# Patient Record
Sex: FEMALE | NOT HISPANIC OR LATINO | Employment: FULL TIME | URBAN - NONMETROPOLITAN AREA
[De-identification: names, ages, dates, MRNs, and addresses within clinical notes are randomized per-mention and may not be internally consistent; named-entity substitution may affect disease eponyms.]

---

## 2017-10-03 ENCOUNTER — TELEPHONE (OUTPATIENT)
Dept: MEDICAL GROUP | Facility: PHYSICIAN GROUP | Age: 32
End: 2017-10-03

## 2017-10-03 DIAGNOSIS — B20 HIV (HUMAN IMMUNODEFICIENCY VIRUS INFECTION) (HCC): ICD-10-CM

## 2017-10-03 RX ORDER — ATAZANAVIR 300 MG/1
CAPSULE ORAL
Qty: 30 CAP | Refills: 11 | Status: SHIPPED | OUTPATIENT
Start: 2017-10-03 | End: 2018-07-18 | Stop reason: SDUPTHER

## 2017-10-03 RX ORDER — EMTRICITABINE AND TENOFOVIR DISOPROXIL FUMARATE 200; 300 MG/1; MG/1
1 TABLET, FILM COATED ORAL DAILY
Qty: 30 TAB | Refills: 11 | Status: SHIPPED | OUTPATIENT
Start: 2017-10-03 | End: 2018-07-18 | Stop reason: SDUPTHER

## 2017-10-03 NOTE — TELEPHONE ENCOUNTER
Patient did not show for appt. Will see patient October 18, 2017.   Will resume treatment.  Genosure viewed.   RX sent to Pharmacy.

## 2017-10-18 ENCOUNTER — TELEMEDICINE2 (OUTPATIENT)
Dept: MEDICAL GROUP | Facility: PHYSICIAN GROUP | Age: 32
End: 2017-10-18
Payer: COMMERCIAL

## 2017-10-18 VITALS
DIASTOLIC BLOOD PRESSURE: 80 MMHG | WEIGHT: 138 LBS | HEIGHT: 63 IN | TEMPERATURE: 98.4 F | SYSTOLIC BLOOD PRESSURE: 130 MMHG | RESPIRATION RATE: 18 BRPM | BODY MASS INDEX: 24.45 KG/M2 | HEART RATE: 88 BPM

## 2017-10-18 DIAGNOSIS — B20 HIV (HUMAN IMMUNODEFICIENCY VIRUS INFECTION) (HCC): ICD-10-CM

## 2017-10-18 PROCEDURE — 99213 OFFICE O/P EST LOW 20 MIN: CPT | Mod: GT | Performed by: NURSE PRACTITIONER

## 2017-10-18 NOTE — PROGRESS NOTES
"Chief Complaint   Patient presents with   • HIV Positive/AIDS           HISTORY OF PRESENT ILLNESS: Patient is a 32 y.o. 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 She admits to relapse. This without medication for a total of 6 months. Upon coming to long term patient was restarted on Norvir 100 mg, Truvada one daily and Reyataz 301 daily. Patient has only been on this for 2 days I suspect her numbers will return to normal as stated in the past. She complains of itchy skin and constipation.  HIV ROS     Medication: Truvada, Norvir 100 mg, Reyataz 300 mg  Missed medications: Just started regimen    CD4 371   Viral Load 17,500   Kidney Function  - normal     Weight loss? None     Night sweats?  None   Body aches ?   none    Skin ? Very dry and itchy  Nonhealing lesions ?  No Rash  ? No  Warts  ? NO    Neuro: No headache, No sensation changes, No dizziness, No confusion.  Cardiac: No cp, No Bazan, No peripheral edema. No calf pain at rest.  Pulm: No cough, No sob, No sputum   Gastro: No nausea, No vomiting,No diarrhea, No rectal bleeding, No abdominal pain  : No dysuria. No blisters, No incontinence.   Constitutional : No fevers, No night sweats.  Musculoskeletal: No swelling,redness or pain to joints. Normal ROM and gait  Skin: rashes, lesions, itching  Emotional: depression ? Stable  anxiety ?  Stable    Psych: hallucinations ?   NO   Auditory hallucinations ?  NO  Paranoid  ?  NO     DRUG ---DRUG interaction? NONE      Allergies   Allergen Reactions   • Pcn [Penicillins]                ROS: As documented in my HPI      Exam:  Blood pressure 130/80, pulse 88, temperature 36.9 °C (98.4 °F), resp. rate 18, height 1.6 m (5' 3\"), weight 62.6 kg (138 lb), last menstrual period 09/28/2017.  General:  Well nourished, well developed female in " NAD  Head: No lesions, Non tender scalp  Neck: Supple. Thyroid is symmetric. No lymphadenopathy   Oral Cavity: no thrush or gum lesions  Pulmonary: .  Normal effort. No rales, ronchi, or wheezing via Telesteth system  Cardiovascular: Regular rate and rhythm without murmur.   Extremities: no clubbing, cyanosis, or edema.  Psych: Alert and oriented x3. Normal mood and affect.  Neurological: No focal deficits    Please note that this dictation was created using voice recognition software. I have made every reasonable attempt to correct obvious errors, but I expect that there are errors of grammar and possibly content that I did not discover before finalizing the note.    Assessment/Plan:  1. HIV (human immunodeficiency virus infection) (CMS-[facility])     Relapse and HIV patient is out of control Patient to start norvir 100 mg , reyataz 300 mg and Truvada daily.  Hydrocortisone 0.1% lidocaine. The  Stool softeners Colace 250 mg at night for severe constipation increased FLAIR intake.  Return to clinic in 3 months  Refill HIV meds  Recheck CD4 , VL, CBC, and CMP in 3 months

## 2017-10-18 NOTE — ASSESSMENT & PLAN NOTE
This is a 32 year old female who has returned to nursing home. She admits to relapse. This without medication for a total of 6 months. Upon coming to [facility] patient was restarted on Norvir 100 mg, Truvada one daily and Reyataz 301 daily. Patient has only been on this for 2 days I suspect her numbers will return to normal as stated in the past. She complains of itchy skin and constipation.  HIV ROS     Medication: Truvada, Norvir 100 mg, Reyataz 300 mg  Missed medications: Just started regimen    CD4 371   Viral Load 17,500   Kidney Function  - normal     Weight loss? None     Night sweats?  None   Body aches ?   none    Skin ? Very dry and itchy  Nonhealing lesions ?  No Rash  ? No  Warts  ? NO    Neuro: No headache, No sensation changes, No dizziness, No confusion.  Cardiac: No cp, No Bazan, No peripheral edema. No calf pain at rest.  Pulm: No cough, No sob, No sputum   Gastro: No nausea, No vomiting,No diarrhea, No rectal bleeding, No abdominal pain  : No dysuria. No blisters, No incontinence.   Constitutional : No fevers, No night sweats.  Musculoskeletal: No swelling,redness or pain to joints. Normal ROM and gait  Skin: rashes, lesions, itching  Emotional: depression ? Stable  anxiety ?  Stable    Psych: hallucinations ?   NO   Auditory hallucinations ?  NO  Paranoid  ?  NO     DRUG ---DRUG interaction? NONE

## 2018-01-24 ENCOUNTER — TELEMEDICINE2 (OUTPATIENT)
Dept: MEDICAL GROUP | Facility: PHYSICIAN GROUP | Age: 33
End: 2018-01-24
Payer: OTHER GOVERNMENT

## 2018-01-24 VITALS
SYSTOLIC BLOOD PRESSURE: 111 MMHG | OXYGEN SATURATION: 98 % | HEART RATE: 80 BPM | WEIGHT: 145 LBS | TEMPERATURE: 98.3 F | HEIGHT: 63 IN | BODY MASS INDEX: 25.69 KG/M2 | DIASTOLIC BLOOD PRESSURE: 83 MMHG

## 2018-01-24 DIAGNOSIS — R82.998: ICD-10-CM

## 2018-01-24 DIAGNOSIS — B20 HIV (HUMAN IMMUNODEFICIENCY VIRUS INFECTION) (HCC): ICD-10-CM

## 2018-01-24 PROCEDURE — 99213 OFFICE O/P EST LOW 20 MIN: CPT | Mod: GT | Performed by: NURSE PRACTITIONER

## 2018-01-24 NOTE — PROGRESS NOTES
"Chief Complaint   Patient presents with   • HIV Positive/AIDS           HISTORY OF PRESENT ILLNESS: Patient is a 32 y.o. 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    Patient is charged with diluted urine for random drug screens at [place]. She stated that she drinks excessive water to dilute the reyataz due to increased bilirubin. Her urine creatine is reported at 13.8 which is alert low.     Medication: Reyataz, Norvir, Truvada  Missed medications:  No     CD4 591  Viral Load 30  Kidney Function normal serum levels     Weight loss?  No     Night sweats? No   Body aches ?   No     Skin ?  Clear no jaundice or icterus  Nonhealing lesions ? No  Rash  ?  No  Warts  ? No     Neuro: No headache, No sensation changes, No dizziness, No confusion.  Cardiac: No cp, No Bazan, No peripheral edema. No calf pain at rest.  Pulm: No cough, No sob, No sputum   Gastro: No nausea, No vomiting,No diarrhea, No rectal bleeding, No abdominal pain  : No dysuria. No blisters, No incontinence.   Constitutional : No fevers, No night sweats.  Musculoskeletal: No swelling,redness or pain to joints. Normal ROM and gait  Skin: rashes, lesions, itching  Emotional: depression ? No  anxiety ? No    Psych: hallucinations ?   No   Auditory hallucinations ? No  Paranoid  ? No     DRUG ---DRUG interaction? No    Patient is vegetarian for over 5 years.       Low urine creatine  Noted at random UDS.  Diluted urine  Diciplinary measures due to diluted urine.     Allergies   Allergen Reactions   • Pcn [Penicillins]                ROS: As documented in my HPI      Exam:  Blood pressure 111/83, pulse 80, temperature 36.8 °C (98.3 °F), height 1.6 m (5' 3\"), weight 65.8 kg (145 lb), last menstrual period 12/21/2017, " SpO2 98 %.  General:  Well nourished, well developed female in NAD  HEENT: Eyes conjunctiva is clear, lids without ptosis, pupils equal round and reactive to light and accommodation.  Ears normal shape and contour, canals are clear bilaterally, TMs with good light reflex and appear normal.  Nasal mucosa pale and edematous with clear rhinorrhea.  Oropharynx benign.  Sinuses (frontal and maxillary) nontender to palpation.  Head: No lesions, Non tender scalp  Neck: Supple. Thyroid is symmetric. No lymphadenopathy   Oral Cavity: no thrush or gum lesions  Pulmonary: .  Normal effort. No rales, ronchi, or wheezing via Telesteth system  Cardiovascular: Regular rate and rhythm without murmur.   Extremities: no clubbing, cyanosis, or edema.  Psych: Alert and oriented x3. Normal mood and affect.  Neurological: No focal deficits    Please note that this dictation was created using voice recognition software. I have made every reasonable attempt to correct obvious errors, but I expect that there are errors of grammar and possibly content that I did not discover before finalizing the note.    Assessment/Plan:  1. HIV (human immunodeficiency virus infection) (CMS-[facility])  Current status of condition is chronic and controlled on therapy.     2. Low urine creatine   Might be variation due to VEGETARIAN DIET  Or super hydrated due to fear of hyperbilirubinemia   Email sent to her RN  Letter in regard of need of hydration.

## 2018-01-24 NOTE — ASSESSMENT & PLAN NOTE
HIV ROS   Patient complaining of sore throat, runny nose, burning eyes and cough for 4 days.    Patient is charged with diluted urine for random drug screens at [place]. She stated that she drinks excessive water to dilute the reyataz due to increased bilirubin. Her urine creatine is reported at 13.8 which is alert low.     Medication: Reyataz, Norvir, Truvada  Missed medications:  No     CD4 591  Viral Load 30  Kidney Function normal serum levels     Weight loss?  No     Night sweats? No   Body aches ?   No     Skin ?  Clear no jaundice or icterus  Nonhealing lesions ? No  Rash  ?  No  Warts  ? No     Neuro: No headache, No sensation changes, No dizziness, No confusion.  Cardiac: No cp, No Bazan, No peripheral edema. No calf pain at rest.  Pulm: No cough, No sob, No sputum   Gastro: No nausea, No vomiting,No diarrhea, No rectal bleeding, No abdominal pain  : No dysuria. No blisters, No incontinence.   Constitutional : No fevers, No night sweats.  Musculoskeletal: No swelling,redness or pain to joints. Normal ROM and gait  Skin: rashes, lesions, itching  Emotional: depression ? No  anxiety ? No    Psych: hallucinations ?   No   Auditory hallucinations ? No  Paranoid  ? No     DRUG ---DRUG interaction? No    Patient is vegetarian for over 5 years.

## 2018-01-24 NOTE — LETTER
January 24, 2018        Verenice Baig  [facility]  [address]  [facility] [address]      To whom it may concern: Patient is taking ANTIRETROVIRAL medications that include protease inhibitors. Reyataz and Norvir can contribute to elevated bilirubin and the blood. This is a byproduct of the medicine that is not usually harmful.  The yellowing of the eyes and skin can be disconcerting to patient. They often requests change of medication. This patient's current regiment is working, affordable and patient preference.  In order to avoid some of the increase of bilirubin often patients will hydrate.    This may cause a falsely diluted urine.    In this case the patient is reported to have a low urine creatinine.  This low creatinine also can be the result of a vegetarian diet or animal protein is very limited.    I would recommend patient have repeat urinalysis. Keep her hydration at a reasonable level. If creatinine continues to be low then checking this with medical would be advised.      If you have any questions or concerns, please don't hesitate to call.        Sincerely,        DONALD Malone.    Electronically Signed

## 2018-07-18 ENCOUNTER — TELEMEDICINE2 (OUTPATIENT)
Dept: MEDICAL GROUP | Facility: PHYSICIAN GROUP | Age: 33
End: 2018-07-18
Payer: OTHER GOVERNMENT

## 2018-07-18 VITALS
BODY MASS INDEX: 23.5 KG/M2 | HEART RATE: 70 BPM | TEMPERATURE: 98.2 F | OXYGEN SATURATION: 99 % | HEIGHT: 63 IN | RESPIRATION RATE: 18 BRPM | DIASTOLIC BLOOD PRESSURE: 74 MMHG | WEIGHT: 132.6 LBS | SYSTOLIC BLOOD PRESSURE: 117 MMHG

## 2018-07-18 DIAGNOSIS — B20 HIV (HUMAN IMMUNODEFICIENCY VIRUS INFECTION) (HCC): ICD-10-CM

## 2018-07-18 PROCEDURE — 99213 OFFICE O/P EST LOW 20 MIN: CPT | Performed by: NURSE PRACTITIONER

## 2018-07-18 RX ORDER — EMTRICITABINE AND TENOFOVIR DISOPROXIL FUMARATE 200; 300 MG/1; MG/1
1 TABLET, FILM COATED ORAL DAILY
Qty: 30 TAB | Refills: 11 | Status: SHIPPED | OUTPATIENT
Start: 2018-07-18 | End: 2018-07-18 | Stop reason: SDUPTHER

## 2018-07-18 RX ORDER — EMTRICITABINE AND TENOFOVIR DISOPROXIL FUMARATE 200; 300 MG/1; MG/1
1 TABLET, FILM COATED ORAL DAILY
Qty: 30 TAB | Refills: 11 | Status: SHIPPED
Start: 2018-07-18 | End: 2018-10-17 | Stop reason: SDUPTHER

## 2018-07-18 RX ORDER — ATAZANAVIR 300 MG/1
CAPSULE ORAL
Qty: 30 CAP | Refills: 11 | Status: SHIPPED
Start: 2018-07-18 | End: 2018-10-17 | Stop reason: SDUPTHER

## 2018-07-18 RX ORDER — ATAZANAVIR 300 MG/1
CAPSULE ORAL
Qty: 30 CAP | Refills: 11 | Status: SHIPPED | OUTPATIENT
Start: 2018-07-18 | End: 2018-07-18 | Stop reason: SDUPTHER

## 2018-07-18 NOTE — PROGRESS NOTES
Chief Complaint   Patient presents with   • HIV Positive/AIDS           HISTORY OF PRESENT ILLNESS: Patient is a 33 y.o. female established patient 33 who presents today to discuss:    Verified Identification with patient.   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  HIV ROS     Medication:   Current Outpatient Prescriptions:   •  atazanavir, Take one daily, 7/18/2018  •  ritonavir, One daily, 7/18/2018  •  emtricitabine-tenofovir, 1 Tab, Oral, DAILY, 7/18/2018  •  multivitamin, 1 Tab, Oral, DAILY, 7/18/2018    Missed medications: 3 months - walked away from treatment.    CD4: 375   Viral Load 11,000   Kidney Function: normal     Weight loss?  No     Night sweats? No   Body aches ?  No     Skin ? Left AC space pimple  Nonhealing lesions ? Yes  Rash  ? No  Warts  ? No     Neuro: No headache, No sensation changes, No dizziness, No confusion.  Cardiac: No cp, No Bazan, No peripheral edema. No calf pain at rest.  Pulm: No cough, No sob, No sputum   Gastro: No nausea, No vomiting,No diarrhea, Having RECTAL bleeding after BM, No abdominal pain  : No dysuria. No blisters, No incontinence.   Constitutional : No fevers, No night sweats.  Musculoskeletal: No swelling,redness or pain to joints. Normal ROM and gait  Skin: Rash on right side of face and neck.  Lesion on her left antecubital space  Emotional: depression ? No  anxiety ?  Yes depression related incarceration  Psych: hallucinations ?  No   Auditory hallucinations ? No  Paranoid  ? Yes at times -she is worried about others knowing about her outside life and possible retaliation.  No self harming thoughts     DRUG ---DRUG interaction? No    Patient is having some bad dreams on her which she thinks is her medication interaction.  Setting Insomnia and is requesting something for sleep.  Her mental health worker suggested  "speaking to me.  I have asked her to think about using Benadryl 50 mg at night.  I am reluctant to switch her back to a trip the patient desires a she still is childbearing age and it may cause more interactions which may proceed to more insomnia and bad dreams.  For now we will try Benadryl 50 mg p.o. at night    Patient is also having tremendous constipation and hard stool I will recommend that she have Colace 2 at night for her constipation and hemorrhoidal cream per day in which she is experiencing right now.    Allergies   Allergen Reactions   • Pcn [Penicillins]                ROS: As documented in my HPI      Exam:  Blood pressure 117/74, pulse 70, temperature 36.8 °C (98.2 °F), resp. rate 18, height 1.6 m (5' 3\"), weight 60.1 kg (132 lb 9.6 oz), last menstrual period 06/18/2018, SpO2 99 %, not currently breastfeeding.  General:  Well nourished, well developed female in NAD  Head: No lesions, Non tender scalp  Neck: Supple. Thyroid is symmetric. No lymphadenopathy   Oral Cavity: no thrush or gum lesions  Pulmonary: .  Normal effort. No rales, ronchi, or wheezing via Telesteth system  Cardiovascular: Regular rate and rhythm without murmur.   Extremities: no clubbing, cyanosis, or edema.  Psych: Alert and oriented x3. Normal mood and affect.  Neurological: No focal deficits    Please note that this dictation was created using voice recognition software. I have made every reasonable attempt to correct obvious errors, but I expect that there are errors of grammar and possibly content that I did not discover before finalizing the note.    Assessment/Plan:  1. HIV (human immunodeficiency virus infection) (HCC)  atazanavir (REYATAZ) 300 MG capsule    ritonavir (NORVIR) 100 MG Cap    emtricitabine-tenofovir (TRUVADA) 200-300 MG per tablet    HIV is uncontrolled.  Chronic condition secondary to patient withdrawal of medicine.  Back on antiretrovirals now for 2 weeks.  Return to clinic in 3 months  Refill HIV " meds  Recheck CD4 , VL, CBC, and CMP in 3 months               2.   Hemorrhoids- patient to use Colace at night as a stool softener, increase water avoid foods that are constipating.  Hemorrhoidal cream recommended.            3.  Insomnia- medication reaction will try Benadryl 50 mg at night for sleep also recommended return to mental health to discuss her anxiety and paranoia.         4.  Persistent itchiness and rash on her right neck -Celeste Chou APRAMANDA patient had TAC 0.1% cream to use on her neck and face twice a week.  As needed           5.  Small pustule left antecubital space patient is not on IV injector that showed up recently: Patient apply warm compresses use triple antibiotic ointment.  It does not resolve to [place] to see medical.

## 2018-07-18 NOTE — ASSESSMENT & PLAN NOTE
Patient is here to follow-up 636,   Currently CD4 count is 375 and a viral load of 11,000.  HIV ROS     Medication:   Current Outpatient Prescriptions:   •  atazanavir, Take one daily, 7/18/2018  •  ritonavir, One daily, 7/18/2018  •  emtricitabine-tenofovir, 1 Tab, Oral, DAILY, 7/18/2018  •  multivitamin, 1 Tab, Oral, DAILY, 7/18/2018    Missed medications: 3 months - walked away from treatment.    CD4: 375   Viral Load 11,000   Kidney Function: normal     Weight loss?  No     Night sweats? No   Body aches ?  No     Skin ? Left AC space pimple  Nonhealing lesions ? Yes  Rash  ? No  Warts  ? No     Neuro: No headache, No sensation changes, No dizziness, No confusion.  Cardiac: No cp, No Bazan, No peripheral edema. No calf pain at rest.  Pulm: No cough, No sob, No sputum   Gastro: No nausea, No vomiting,No diarrhea, Having RECTAL bleeding after BM, No abdominal pain  : No dysuria. No blisters, No incontinence.   Constitutional : No fevers, No night sweats.  Musculoskeletal: No swelling,redness or pain to joints. Normal ROM and gait  Skin: Rash on right side of face and neck.  Lesion on her left antecubital space  Emotional: depression ? No  anxiety ?  Yes depression related incarceration  Psych: hallucinations ?  No   Auditory hallucinations ? No  Paranoid  ? Yes at times -she is worried about others knowing about her outside life and possible retaliation.  No self harming thoughts     DRUG ---DRUG interaction? No    Patient is having some bad dreams on her which she thinks is her medication interaction.  Setting Insomnia and is requesting something for sleep.  Her mental health worker suggested speaking to me.  I have asked her to think about using Benadryl 50 mg at night.  I am reluctant to switch her back to a trip the patient desires a she still is childbearing age and it may cause more interactions which may proceed to more insomnia and bad dreams.  For now we will try Benadryl 50 mg p.o. at night    Patient is  also having tremendous constipation and hard stool I will recommend that she have Colace 2 at night for her constipation and hemorrhoidal cream per day in which she is experiencing right now.

## 2018-10-17 ENCOUNTER — TELEMEDICINE2 (OUTPATIENT)
Dept: MEDICAL GROUP | Facility: PHYSICIAN GROUP | Age: 33
End: 2018-10-17
Payer: OTHER GOVERNMENT

## 2018-10-17 VITALS
RESPIRATION RATE: 18 BRPM | TEMPERATURE: 98.4 F | DIASTOLIC BLOOD PRESSURE: 74 MMHG | BODY MASS INDEX: 23.13 KG/M2 | HEART RATE: 111 BPM | WEIGHT: 130.6 LBS | SYSTOLIC BLOOD PRESSURE: 125 MMHG

## 2018-10-17 DIAGNOSIS — B20 HIV (HUMAN IMMUNODEFICIENCY VIRUS INFECTION) (HCC): ICD-10-CM

## 2018-10-17 PROCEDURE — 99213 OFFICE O/P EST LOW 20 MIN: CPT | Performed by: NURSE PRACTITIONER

## 2018-10-17 RX ORDER — EMTRICITABINE AND TENOFOVIR DISOPROXIL FUMARATE 200; 300 MG/1; MG/1
1 TABLET, FILM COATED ORAL DAILY
Qty: 30 TAB | Refills: 11 | Status: SHIPPED | OUTPATIENT
Start: 2018-10-17 | End: 2019-05-01

## 2018-10-17 RX ORDER — ATAZANAVIR 300 MG/1
CAPSULE ORAL
Qty: 30 CAP | Refills: 11 | Status: SHIPPED | OUTPATIENT
Start: 2018-10-17 | End: 2019-05-01

## 2018-10-17 NOTE — PROGRESS NOTES
Chief Complaint   Patient presents with   • HIV Positive/AIDS           HISTORY OF PRESENT ILLNESS: Patient is a 33 y.o. 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 She is complaining of heart racing, fatigue and bad dreams regarding her HIV medications. She also is concerned regarding the yellowing of her eyes secondary to Antiretroviral medications  Patient is vegetarian.   HIV ROS     Medication:   Current Outpatient Prescriptions:   •  atazanavir, Take one daily  •  emtricitabine-tenofovir, 1 Tab, Oral, DAILY  •  ritonavir, One daily  •  multivitamin, 1 Tab, Oral, DAILY, Taking    Missed medications: yes - one week due to pictures being taken and she did not want yellow eyes.     CD4 436   Viral Load < 20   Kidney Function : normal  CBC: hgb - 10.9/ Hct 34    Weight loss? no .    Night sweats?no .   Body aches ?  no .    Skin ? Itching - no lesions wants her TAC refilled   Neuro: No headache, No sensation changes, No dizziness, No confusion.  Cardiac: No cp, No Bazan, No peripheral edema. No calf pain at rest.BRUISING  Pulm: No cough, No sob, No sputum FATIGUE and SOB at night  Gastro: No nausea, No vomiting,No diarrhea, No rectal bleeding, No abdominal pain  : No dysuria. No blisters, No incontinence.   Constitutional : No fevers, No night sweats.  Musculoskeletal: No swelling,redness or pain to joints. Normal ROM and gait  Skin: rashes, lesions, itching  PHQ 2 negative Depression Screening      Psych: hallucinations ? no .     Auditory hallucinations ? no .   Paranoid  ? no .    DRUG ---DRUG interaction? no .       Having vivid dreams on medications requesting Benadryl to sleep.      Allergies   Allergen Reactions   • Pcn [Penicillins]                ROS: As documented in my HPI      Exam:  Blood pressure 125/74, pulse (!) 111, temperature 36.9 °C (98.4  °F), resp. rate 18, weight 59.2 kg (130 lb 9.6 oz), last menstrual period 09/24/2018, not currently breastfeeding.  General:  Well nourished, well developed female in NAD  Head: No lesions, Non tender scalp  Neck: Supple. Thyroid is symmetric. No lymphadenopathy   Oral Cavity: no thrush or gum lesions  Pulmonary: .  Normal effort. No rales, ronchi, or wheezing via Telesteth system  Cardiovascular: Regular rate and rhythm without murmur.   Extremities: no clubbing, cyanosis, or edema.  Psych: Alert and oriented x3. Normal mood and affect.  Neurological: No focal deficits  SKIN: No lesions  PALE - conjunctiva     Please note that this dictation was created using voice recognition software. I have made every reasonable attempt to correct obvious errors, but I expect that there are errors of grammar and possibly content that I did not discover before finalizing the note.    Assessment/Plan:  1. HIV (human immunodeficiency virus infection) ([facility])  atazanavir (REYATAZ) 300 MG capsule    emtricitabine-tenofovir (TRUVADA) 200-300 MG per tablet    ritonavir (NORVIR) 100 MG Cap    Chronic and controlled: Recheck labs, viral load, CBC, CMP and CD4 - obtain care in [place] asa.       ANEMIA - new problem - start Feso4 365 mg daily - Follow up with MEDICAL     Dermatitis - chronic and controlled when using TAC 0.1%    Vivid dreams - chronic and controlled on Bendadryl 50 mg at night.    Constipation - chronic and controlled on colace at bedtime.

## 2018-10-17 NOTE — ASSESSMENT & PLAN NOTE
This is a 33 year old female. She is complaining of heart racing, fatigue and bad dreams regarding her HIV medications. She also is concerned regarding the yellowing of her eyes secondary to Antiretroviral medications  Patient is vegetarian.   HIV ROS     Medication:   Current Outpatient Prescriptions:   •  atazanavir, Take one daily  •  emtricitabine-tenofovir, 1 Tab, Oral, DAILY  •  ritonavir, One daily  •  multivitamin, 1 Tab, Oral, DAILY, Taking    Missed medications: yes - one week due to pictures being taken and she did not want yellow eyes.     CD4 436   Viral Load < 20   Kidney Function : normal  CBC: hgb - 10.9/ Hct 34    Weight loss? no .    Night sweats?no .   Body aches ?  no .    Skin ? Itching - no lesions wants her TAC refilled   Neuro: No headache, No sensation changes, No dizziness, No confusion.  Cardiac: No cp, No Bazan, No peripheral edema. No calf pain at rest.  Pulm: No cough, No sob, No sputum   Gastro: No nausea, No vomiting,No diarrhea, No rectal bleeding, No abdominal pain  : No dysuria. No blisters, No incontinence.   Constitutional : No fevers, No night sweats.  Musculoskeletal: No swelling,redness or pain to joints. Normal ROM and gait  Skin: rashes, lesions, itching  PHQ 2 negative Depression Screening    Little interest or pleasure in doing things?      Feeling down, depressed , or hopeless?     Trouble falling or staying asleep, or sleeping too much?      Feeling tired or having little energy?      Poor appetite or overeating?      Feeling bad about yourself - or that you are a failure or have let yourself or your family down?     Trouble concentrating on things, such as reading the newspaper or watching television?     Moving or speaking so slowly that other people could have noticed.  Or the opposite - being so fidgety or restless that you have been moving around a lot more than usual?      Thoughts that you would be better off dead, or of hurting yourself?      Patient Health  Questionnaire Score:         If depressive symptoms identified deferred to follow up visit unless specifically addressed in assesment and plan.    Interpretation of PHQ-9 Total Score   Score Severity   1-4 No Depression   5-9 Mild Depression   10-14 Moderate Depression   15-19 Moderately Severe Depression   20-27 Severe Depression      Psych: hallucinations ? no .     Auditory hallucinations ? no .   Paranoid  ? no .    DRUG ---DRUG interaction? no .       Having vivid dreams on medications requesting Benadryl to sleep.

## 2019-04-30 NOTE — PROGRESS NOTES
[facility] HIV TELECONFERENCE CLINIC NOTE     Date of Service: 4/30/2019    Referring Physician: MILA    Reason for Referral: Establish care for HIV    Chief Complaint: HIV care    History of Present Illness:     Verenice Baig is a 34 y.o. female with HIV, no other significant PMH.  Patient states that she will not take these medications any longer because of the yellowness of her eyes.  She states that people have told her things, and does not want to take these any longer.  She also has generalized itching, most pronounced over her anterior thighs.  She wishes to continue with medications that are appropriate for use in childbearing age women.    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  OIs: None    Vaccines  There is no immunization history on file for this patient.    Pertinent Lab Results:    Date  CD4/%  HIV Viral Load  4/16/2019 452/37.7 <20  10/1/2018 436/39.6 <20  6/11/2018 375/34.1 11,000  3/20/2018 417/52.1 30  1/11/2018 591/39.4 30  8/15/2017 371/33.7 17,500  8/11/2016 490/41.1 <20  4/1/2016 501/35.8 <20  1/25/2016 271/33.9 670  4/29/2015 668/39.3 <20  12/19/2014 413/45.9 <20  10/3/2014 468/42.5 90    Screening:   HBV serologies:  HAV serology: Immune  HCV Ab:  RPR: 1:1 August 2017  PPD: Negative on 9/11/2019  Urine GC/CT:  UA:    LABS  Date  WBC  HGB  PLAT  4/16/2019 4.9  12.4  228    Date  CR/BUN GFR    4/16/2019 0.96/5  77    Date  PT/INR  TBili  AlkPh  AST ALT Album  4/16/2019   2.7  71  13 8 4.4      Lipids   Date  Chol Trig HDL VLDL LDL  4/16/2019 163 86 60 17 86    HgbA1C  Date  HbA1c  4/16/2019 5.1%    Review of Systems:  All other systems reviewed and are negative expect as noted in HPI    Past Medical History:   Diagnosis Date   • Anxiety    • Depression    • HIV (human  immunodeficiency virus infection) ([facility]) 10/29/2014   • Substance abuse ([facility])        Past Surgical History:   Procedure Laterality Date   • PRIMARY C SECTION         Family history  Reviewed and not pertinent.      Social History     Social History   • Marital status: Single     Spouse name: N/A   • Number of children: N/A   • Years of education: N/A     Occupational History   • Not on file.     Social History Main Topics   • Smoking status: Former Smoker   • Smokeless tobacco: Never Used   • Alcohol use Yes      Comment: alcoholic   • Drug use: Yes     Types: IV, Methamphetamines   • Sexual activity: Not Currently     Partners: Female     Other Topics Concern   • Not on file     Social History Narrative   • No narrative on file       Allergies   Allergen Reactions   • Pcn [Penicillins]        Medications:  Current Outpatient Prescriptions on File Prior to Visit   Medication Sig Dispense Refill   • atazanavir (REYATAZ) 300 MG capsule Take one daily 30 Cap 11   • emtricitabine-tenofovir (TRUVADA) 200-300 MG per tablet Take 1 Tab by mouth every day. 30 Tab 11   • ritonavir (NORVIR) 100 MG Cap One daily 30 Cap 11   • multivitamin (THERAGRAN) TABS Take 1 Tab by mouth every day.       No current facility-administered medications on file prior to visit.        Physical Exam:   This consultation was conducted utilizing secure and encrypted videoconferencing equipment with the assistance of a trained tele-presenter at the originating site.     Vital Signs: T 98 HR 72 /63 RR 18 O2 100% weight 133 pounds  Vital signs reviewed  Constitutional: Patient is oriented to person, place, and time. Appears well-developed and well-nourished. No distress  Head: Atraumatic, normocephalic  Eyes: Conjunctivae normal, EOM intact.  No icterus was noted by the trained telemetry presenter  Mouth/Throat: Lips without lesions, good dentition, oropharynx is clear and moist.  Neck: Neck supple. No masses/lymphadenopathy  Cardiovascular:  Normal rate, regular rhythm, normal S1S2. No murmur, gallop, or friction rub. No pedal edema.  Pulmonary/Chest: No respiratory distress. Unlabored respiratory effort, lungs clear to auscultation. No wheezes or rales.   Abdominal: Soft, non tender. BS + x 4. No masses or hepatosplenomegaly.   Musculoskeletal: No joint tenderness, swelling, erythema, or restriction of motion noted.  Neurological: Alert and oriented to person, place, and time. No gross cranial nerve deficitSkin:   Skin is warm and dry.  Bruise noted over right anterior thigh, could not see clearly since we could not get the high-definition [employer] at work  Psychiatric: Normal mood and affect. Behavior is normal.     Assessment:   Verenice Baig is a 34 y.o. female with a history of HIV, here to establish care, requesting change of medications due to complaints as above    Plan:   HIV:  -Since patient remains of childbearing age and is still considering future pregnancies, will switch her ART to Complera  -Stop Truvada, Reyataz, Norvir.  Start p.o. Complera 1 tab daily (ordered)   -Please repeat viral load 4 weeks after change of ART  -Please check hepatitis B surface antigen, surface antibody, total core antibody  -Please check hepatitis C antibody  -Please check serum RPR  -Please check UA  -Please check urine GC/CT NAAT  -If not already received, recommend Pneumococcal vaccination with PCV 13, followed by PPSV 23 at least 8 weeks later    Hyperbilirubinemia:  Expected lab finding secondary to atazanavir.  Switching ART as above, should return to normal once this happens    Pruritus:  Likely secondary to hyperbilirubinemia, switching ART as above.  Monitor    Return to clinic in 3 months with repeat HIV labs    Fili Kirkpatrick M.D.    Please note that this dictation was created using voice recognition software. I have worked with technical experts from [employer] to optimize the interface.  I have made every reasonable attempt to correct  obvious errors, but there may be errors of grammar and possibly content that I did not discover before finalizing the note.

## 2019-05-01 ENCOUNTER — TELEMEDICINE2 (OUTPATIENT)
Dept: VASCULAR LAB | Facility: MEDICAL CENTER | Age: 34
End: 2019-05-01
Attending: INTERNAL MEDICINE
Payer: OTHER GOVERNMENT

## 2019-05-01 DIAGNOSIS — E80.6 HYPERBILIRUBINEMIA: ICD-10-CM

## 2019-05-01 DIAGNOSIS — L29.9 PRURITUS: ICD-10-CM

## 2019-05-01 DIAGNOSIS — B20 HIV (HUMAN IMMUNODEFICIENCY VIRUS INFECTION) (HCC): ICD-10-CM

## 2019-05-01 PROCEDURE — 99205 OFFICE O/P NEW HI 60 MIN: CPT | Performed by: INTERNAL MEDICINE

## 2019-07-10 ENCOUNTER — TELEPHONE (OUTPATIENT)
Dept: INFECTIOUS DISEASES | Facility: MEDICAL CENTER | Age: 34
End: 2019-07-10